# Patient Record
Sex: FEMALE | Race: BLACK OR AFRICAN AMERICAN
[De-identification: names, ages, dates, MRNs, and addresses within clinical notes are randomized per-mention and may not be internally consistent; named-entity substitution may affect disease eponyms.]

---

## 2022-01-09 ENCOUNTER — HOSPITAL ENCOUNTER (OUTPATIENT)
Dept: HOSPITAL 75 - WSO | Age: 27
Discharge: HOME | End: 2022-01-09
Attending: STUDENT IN AN ORGANIZED HEALTH CARE EDUCATION/TRAINING PROGRAM
Payer: MEDICAID

## 2022-01-09 VITALS — HEIGHT: 62.99 IN | BODY MASS INDEX: 43.05 KG/M2 | WEIGHT: 242.95 LBS

## 2022-01-09 VITALS — DIASTOLIC BLOOD PRESSURE: 66 MMHG | SYSTOLIC BLOOD PRESSURE: 116 MMHG

## 2022-01-09 DIAGNOSIS — O26.899: Primary | ICD-10-CM

## 2022-01-09 DIAGNOSIS — Z3A.00: ICD-10-CM

## 2022-01-09 DIAGNOSIS — R25.2: ICD-10-CM

## 2022-01-09 LAB
APTT PPP: YELLOW S
BACTERIA #/AREA URNS HPF: (no result) /HPF
BILIRUB UR QL STRIP: NEGATIVE
FIBRINOGEN PPP-MCNC: CLEAR MG/DL
GLUCOSE UR STRIP-MCNC: NEGATIVE MG/DL
KETONES UR QL STRIP: NEGATIVE
LEUKOCYTE ESTERASE UR QL STRIP: NEGATIVE
NITRITE UR QL STRIP: NEGATIVE
OTHER ELEMENTS URNS MICRO: (no result) /HPF
PH UR STRIP: 6 [PH] (ref 5–9)
PROT UR QL STRIP: NEGATIVE
RBC #/AREA URNS HPF: (no result) /HPF
SP GR UR STRIP: >=1.03 (ref 1.02–1.02)
SQUAMOUS #/AREA URNS HPF: (no result) /HPF
WBC #/AREA URNS HPF: (no result) /HPF
YEAST #/AREA URNS HPF: (no result) /HPF

## 2022-01-09 PROCEDURE — 87210 SMEAR WET MOUNT SALINE/INK: CPT

## 2022-01-09 PROCEDURE — 81000 URINALYSIS NONAUTO W/SCOPE: CPT

## 2022-01-09 PROCEDURE — 99214 OFFICE O/P EST MOD 30 MIN: CPT

## 2022-01-09 PROCEDURE — 87088 URINE BACTERIA CULTURE: CPT

## 2022-01-10 NOTE — PHYSICIAN QUERY-FINAL DX
Clinic Account Progress/Dx


Physician Query:


Please give diagnosis





Please include # weeks gestation


Date of Service





Jan 9, 2022 at 08:08











GEETHA,JOHNNY                      Johnny 10, 2022 08:28

## 2022-02-12 ENCOUNTER — HOSPITAL ENCOUNTER (OUTPATIENT)
Dept: HOSPITAL 75 - LDRP | Age: 27
End: 2022-02-12
Attending: OBSTETRICS & GYNECOLOGY
Payer: MEDICAID

## 2022-02-12 VITALS — SYSTOLIC BLOOD PRESSURE: 123 MMHG | DIASTOLIC BLOOD PRESSURE: 65 MMHG

## 2022-02-12 VITALS — BODY MASS INDEX: 45.66 KG/M2 | WEIGHT: 257.72 LBS | HEIGHT: 62.99 IN

## 2022-02-12 VITALS — DIASTOLIC BLOOD PRESSURE: 64 MMHG | SYSTOLIC BLOOD PRESSURE: 123 MMHG

## 2022-02-12 DIAGNOSIS — Z3A.28: ICD-10-CM

## 2022-02-12 DIAGNOSIS — O26.893: Primary | ICD-10-CM

## 2022-02-12 PROCEDURE — 99213 OFFICE O/P EST LOW 20 MIN: CPT

## 2022-02-14 NOTE — PHYSICIAN QUERY-FINAL DX
GEETHA,JAN 2/14/22 0809:


Clinic Account Progress/Dx


Physician Query:


Please give diagnosis





Please include # weeks gestation


Date of Service





Feb 12, 2022 at 21:35





JAMISON POWERS DO 2/14/22 1611:


Clinic Account Progress/Dx


DIAGNOSIS:


Diagnosis


28 week gestation


cramping











GEETHA,JAN Feb 14, 2022 08:09


JAMISON POWERS DO               Feb 14, 2022 16:11

## 2022-11-09 ENCOUNTER — HOSPITAL ENCOUNTER (EMERGENCY)
Dept: HOSPITAL 75 - ER | Age: 27
End: 2022-11-09
Payer: MEDICAID

## 2022-11-09 VITALS — SYSTOLIC BLOOD PRESSURE: 123 MMHG | DIASTOLIC BLOOD PRESSURE: 76 MMHG

## 2022-11-09 VITALS — BODY MASS INDEX: 41.41 KG/M2 | WEIGHT: 233.69 LBS | HEIGHT: 62.99 IN

## 2022-11-09 DIAGNOSIS — R10.13: ICD-10-CM

## 2022-11-09 DIAGNOSIS — Z28.310: ICD-10-CM

## 2022-11-09 DIAGNOSIS — R19.7: ICD-10-CM

## 2022-11-09 DIAGNOSIS — Z3A.10: ICD-10-CM

## 2022-11-09 DIAGNOSIS — O99.611: ICD-10-CM

## 2022-11-09 DIAGNOSIS — R11.0: ICD-10-CM

## 2022-11-09 DIAGNOSIS — O21.9: ICD-10-CM

## 2022-11-09 DIAGNOSIS — Z20.822: ICD-10-CM

## 2022-11-09 DIAGNOSIS — O26.891: Primary | ICD-10-CM

## 2022-11-09 DIAGNOSIS — R07.89: ICD-10-CM

## 2022-11-09 LAB
ALBUMIN SERPL-MCNC: 3.8 GM/DL (ref 3.2–4.5)
ALP SERPL-CCNC: 100 U/L (ref 40–136)
ALT SERPL-CCNC: 27 U/L (ref 0–55)
BASOPHILS # BLD AUTO: 0 10^3/UL (ref 0–0.1)
BASOPHILS NFR BLD AUTO: 0 % (ref 0–10)
BILIRUB SERPL-MCNC: 0.3 MG/DL (ref 0.1–1)
BUN/CREAT SERPL: 12
CALCIUM SERPL-MCNC: 9 MG/DL (ref 8.5–10.1)
CHLORIDE SERPL-SCNC: 109 MMOL/L (ref 98–107)
CO2 SERPL-SCNC: 19 MMOL/L (ref 21–32)
CREAT SERPL-MCNC: 0.77 MG/DL (ref 0.6–1.3)
EOSINOPHIL # BLD AUTO: 0.2 10^3/UL (ref 0–0.3)
EOSINOPHIL NFR BLD AUTO: 2 % (ref 0–10)
GFR SERPLBLD BASED ON 1.73 SQ M-ARVRAT: 110 ML/MIN
GLUCOSE SERPL-MCNC: 97 MG/DL (ref 70–105)
HCT VFR BLD CALC: 36 % (ref 35–52)
HGB BLD-MCNC: 11.9 G/DL (ref 11.5–16)
LIPASE SERPL-CCNC: 24 U/L (ref 8–78)
LYMPHOCYTES # BLD AUTO: 1.6 10^3/UL (ref 1–4)
LYMPHOCYTES NFR BLD AUTO: 21 % (ref 12–44)
MAGNESIUM SERPL-MCNC: 2 MG/DL (ref 1.6–2.4)
MANUAL DIFFERENTIAL PERFORMED BLD QL: NO
MCH RBC QN AUTO: 25 PG (ref 25–34)
MCHC RBC AUTO-ENTMCNC: 33 G/DL (ref 32–36)
MCV RBC AUTO: 75 FL (ref 80–99)
MONOCYTES # BLD AUTO: 0.6 10^3/UL (ref 0–1)
MONOCYTES NFR BLD AUTO: 8 % (ref 0–12)
NEUTROPHILS # BLD AUTO: 5.1 10^3/UL (ref 1.8–7.8)
NEUTROPHILS NFR BLD AUTO: 69 % (ref 42–75)
PLATELET # BLD: 297 10^3/UL (ref 130–400)
PMV BLD AUTO: 10.5 FL (ref 9–12.2)
POTASSIUM SERPL-SCNC: 3.6 MMOL/L (ref 3.6–5)
PROT SERPL-MCNC: 6.9 GM/DL (ref 6.4–8.2)
SODIUM SERPL-SCNC: 136 MMOL/L (ref 135–145)
WBC # BLD AUTO: 7.4 10^3/UL (ref 4.3–11)

## 2022-11-09 PROCEDURE — 80053 COMPREHEN METABOLIC PANEL: CPT

## 2022-11-09 PROCEDURE — 36415 COLL VENOUS BLD VENIPUNCTURE: CPT

## 2022-11-09 PROCEDURE — 93005 ELECTROCARDIOGRAM TRACING: CPT

## 2022-11-09 PROCEDURE — 83690 ASSAY OF LIPASE: CPT

## 2022-11-09 PROCEDURE — 83735 ASSAY OF MAGNESIUM: CPT

## 2022-11-09 PROCEDURE — 85025 COMPLETE CBC W/AUTO DIFF WBC: CPT

## 2022-11-09 PROCEDURE — 87636 SARSCOV2 & INF A&B AMP PRB: CPT

## 2022-11-09 NOTE — ED GENERAL
General


Chief Complaint:  Chest Pain


Stated Complaint:  CHEST PAIN, BACK PAIN, PREGNANT


Nursing Triage Note:  


pt ambulatory to room. states she started to have chest pain that radiates 


through to her back approx two hours pta while walking around. states the pain 


feels like she fell on her back but she denies injury. pt states she also has 


middle abd pain that started approx 1 hr pta. states she is 10 weeks pregnant, 


denies any vag bleeding. states she has not taken any tylenol or other meds for 


pain.


Source of Information:  Patient


Exam Limitations:  No Limitations





History of Present Illness


Date Seen by Provider:  Nov 9, 2022


Time Seen by Provider:  18:21





Allergies and Home Medications


Allergies


Coded Allergies:  


     No Known Drug Allergies (Unverified , 11/13/15)





Patient Home Medication List


Home Medication List Reviewed:  Yes


Famotidine (Pepcid) 20 Mg Tablet, 20 MG PO BID


   Prescribed by: ROMERO MCDONNELL on 11/9/22 2025


Prenatal Vit #76/Iron,Carb/FA (Pnv 29-1 Tablet) 1 Each Tablet, 1 EACH PO, 

(Reported)


   Entered as Reported by: KANDIS PEREIRA on 7/23/16 1751


Promethazine HCl (Promethazine Tablet) 25 Mg Tablet, 25 MG PO Q8H PRN for 

NAUSEA/VOMITING


   Prescribed by: ROMERO MCDONNELL on 11/9/22 2025





Past Medical-Social-Family Hx


Patient Social History


Tobacco Use?:  No


Use of E-Cig and/or Vaping dev:  No


Substance use?:  No


Alcohol Use?:  No





Immunizations Up To Date


Influenza Vaccine Up-to-Date:  No; Not Current





Seasonal Allergies


Seasonal Allergies:  No





Past Medical History


Irregular Heartbeat


Headaches /Migraines


Last Menstrual Period:  Aug 17, 2022


Reproductive Disorders:  No


Adverse Reaction/Blood Tranf:  No





Family Medical History





Patient reports no known family medical history.


No Pertinent Family Hx





Physical Exam


Vital Signs





Vital Signs - First Documented








 11/9/22





 18:33


 


Temp 37.0


 


Pulse 108


 


Resp 24


 


B/P (MAP) 142/74 (96)


 


Pulse Ox 100





Capillary Refill :


Height, Weight, BMI


Height: 5'4.00"


Weight: 211lbs. 6.0oz. 95.057732ld; 41.00 BMI


Method:Stated


General Appearance:  No Apparent Distress, WD/WN, Obese


Cardiovascular:  Other (Anterior chest wall tenderness)


Gastrointestinal:  Normal Bowel Sounds, Soft; No Distended; Tenderness (Across 

the upper abdomen and most prominent in the epigastrium)





Progress/Results/Core Measures


Suspected Sepsis


SIRS


Temperature: 


Pulse: 108 


Respiratory Rate: 24


 


Laboratory Tests


11/9/22 19:20: White Blood Count 7.4


Blood Pressure 142 /74 


Mean: 96


 


Laboratory Tests


11/9/22 19:20: 


Creatinine 0.77, Platelet Count 297, Total Bilirubin 0.3








Results/Orders


Lab Results





Laboratory Tests








Test


 11/9/22


18:39 11/9/22


19:20 Range/Units


 


 


Influenza Type A (RT-PCR) Not Detected   Not Detecte  


 


Influenza Type B (RT-PCR) Not Detected   Not Detecte  


 


SARS-CoV-2 RNA (RT-PCR) Not Detected   Not Detecte  


 


White Blood Count


 


 7.4 


 4.3-11.0


10^3/uL


 


Red Blood Count


 


 4.80 


 3.80-5.11


10^6/uL


 


Hemoglobin  11.9  11.5-16.0  g/dL


 


Hematocrit  36  35-52  %


 


Mean Corpuscular Volume  75 L 80-99  fL


 


Mean Corpuscular Hemoglobin  25  25-34  pg


 


Mean Corpuscular Hemoglobin


Concent 


 33 


 32-36  g/dL





 


Red Cell Distribution Width  16.2 H 10.0-14.5  %


 


Platelet Count


 


 297 


 130-400


10^3/uL


 


Mean Platelet Volume  10.5  9.0-12.2  fL


 


Immature Granulocyte % (Auto)  0   %


 


Neutrophils (%) (Auto)  69  42-75  %


 


Lymphocytes (%) (Auto)  21  12-44  %


 


Monocytes (%) (Auto)  8  0-12  %


 


Eosinophils (%) (Auto)  2  0-10  %


 


Basophils (%) (Auto)  0  0-10  %


 


Neutrophils # (Auto)


 


 5.1 


 1.8-7.8


10^3/uL


 


Lymphocytes # (Auto)


 


 1.6 


 1.0-4.0


10^3/uL


 


Monocytes # (Auto)


 


 0.6 


 0.0-1.0


10^3/uL


 


Eosinophils # (Auto)


 


 0.2 


 0.0-0.3


10^3/uL


 


Basophils # (Auto)


 


 0.0 


 0.0-0.1


10^3/uL


 


Immature Granulocyte # (Auto)


 


 0.0 


 0.0-0.1


10^3/uL


 


Sodium Level  136  135-145  MMOL/L


 


Potassium Level  3.6  3.6-5.0  MMOL/L


 


Chloride Level  109 H   MMOL/L


 


Carbon Dioxide Level  19 L 21-32  MMOL/L


 


Anion Gap  8  5-14  MMOL/L


 


Blood Urea Nitrogen  9  7-18  MG/DL


 


Creatinine


 


 0.77 


 0.60-1.30


MG/DL


 


Estimat Glomerular Filtration


Rate 


 110 


  





 


BUN/Creatinine Ratio  12   


 


Glucose Level  97    MG/DL


 


Calcium Level  9.0  8.5-10.1  MG/DL


 


Corrected Calcium  9.2  8.5-10.1  MG/DL


 


Magnesium Level  2.0  1.6-2.4  MG/DL


 


Total Bilirubin  0.3  0.1-1.0  MG/DL


 


Aspartate Amino Transf


(AST/SGOT) 


 18 


 5-34  U/L





 


Alanine Aminotransferase


(ALT/SGPT) 


 27 


 0-55  U/L





 


Alkaline Phosphatase  100    U/L


 


Total Protein  6.9  6.4-8.2  GM/DL


 


Albumin  3.8  3.2-4.5  GM/DL


 


Lipase  24  8-78  U/L








My Orders





Orders - ROMERO BANSAL MD


Covid 19 Inhouse Test (11/9/22 18:21)


Influenza A And B By Pcr (11/9/22 18:21)


Ekg Tracing (11/9/22 18:36)


Lidocaine 2% Viscous 15 Ml (Xylocaine Vi (11/9/22 19:00)


Antacid  Suspension (Mylanta  Suspension (11/9/22 19:00)


Famotidine Tablet (Pepcid Tablet) (11/9/22 19:00)


Cbc With Automated Diff (11/9/22 19:00)


Comprehensive Metabolic Panel (11/9/22 19:00)


Lipase (11/9/22 19:00)


Magnesium (11/9/22 19:00)


Ed Iv/Invasive Line Start (11/9/22 19:00)


Lactated Ringers (Lr 1000 Ml Iv Solution (11/9/22 19:00)


Promethazine Injection (Phenergan Injec (11/9/22 19:00)





Medications Given in ED





Current Medications








 Medications  Dose


 Ordered  Sig/Leyda


 Route  Start Time


 Stop Time Status Last Admin


Dose Admin


 


 Al Hydrox/Mg


 Hydrox/Simethicone  30 ml  ONCE  ONCE


 PO  11/9/22 19:00


 11/9/22 19:01 DC 11/9/22 20:10


30 ML


 


 Famotidine  20 mg  ONCE  ONCE


 PO  11/9/22 19:00


 11/9/22 19:01 DC 11/9/22 20:09


20 MG


 


 Lactated Ringer's  1,000 ml @ 


 0 mls/hr  Q0M ONCE


 IV  11/9/22 19:00


 11/9/22 19:01 DC 11/9/22 19:15


999 MLS/HR


 


 Lidocaine HCl  15 ml  ONCE  ONCE


 PO  11/9/22 19:00


 11/9/22 19:01 DC 11/9/22 20:10


15 ML


 


 Promethazine HCl  25 mg  ONCE  ONCE


 IVP  11/9/22 19:00


 11/9/22 19:01 DC 11/9/22 19:15


25 MG








Vital Signs/I&O











 11/9/22 11/9/22





 18:33 21:05


 


Temp 37.0 37.0


 


Pulse 108 91


 


Resp 24 16


 


B/P (MAP) 142/74 (96) 123/76


 


Pulse Ox 100 100





Capillary Refill :








Blood Pressure Mean:                    96











ECG


Initial ECG Impression Date:  Nov 9, 2022


Initial ECG Impression Time:  18:44


Initial ECG Rate:  91


Initial ECG Rhythm:  Normal Sinus


Initial ECG Impression:  Normal


Comment


Sinus rhythm with no ST elevation or depression.  No abnormal intervals or axis 

deviation.





Departure


Impression





   Primary Impression:  


   Nausea vomiting and diarrhea


   Additional Impressions:  


   Upper abdominal pain


   Atypical chest pain


   Pregnancy


   Qualified Codes:  Z3A.10 - 10 weeks gestation of pregnancy


Disposition:  01 HOME, SELF-CARE


Condition:  Improved





Departure-Patient Inst.


Decision time for Depature:  20:22


Referrals:  


CALI ROBLES DO (PCP/Family)


Primary Care Physician


Patient Instructions:  Abdominal Pain, Adult ED, Diarrhea in Adolescents and 

Adults, Nausea and Vomiting, Adult





Add. Discharge Instructions:  


Adhere to a noncarbonated clear liquid diet this evening.


If you are feeling better in the morning, gradually advance your diet with small

quantities of bland food as tolerated.  Avoid dairy products, fatty foods, and 

greasy foods until your diarrhea has been resolved for at least 48 hours.


Use promethazine (Phenergan) as prescribed for nausea or vomiting.  This 

medication may make you drowsy.  You should not drive, operate machinery, or 

make important decisions while on this medication.


Take an antacid medication such as the Pepcid (famotidine) prescribed for at 

least the next 1 to 2 weeks.  Then discussed with Dr. WRIGHT at your 

follow-up appointment.


For discomfort you may take Tylenol (acetaminophen) up to 1000 mg every 6 hours 

as needed.  For heartburn or pain in your upper abdomen, you may also take Tums 

per package instructions.


Return to the emergency room if you have worsening symptoms despite following 

these instructions.





All discharge instructions reviewed with patient and/or family. Voiced 

understanding.


Scripts


Promethazine HCl (Promethazine Tablet) 25 Mg Tablet


25 MG PO Q8H PRN for NAUSEA/VOMITING, #10 TAB


   Prov: ROMERO BANSAL MD         11/9/22 


Famotidine (Pepcid) 20 Mg Tablet


20 MG PO BID, #30 TAB


   Prov: ROMERO BANSAL MD         11/9/22





Copy


Copies To 1:   JEAN WRIGHT MD, JOSHUA T MD         Nov 9, 2022 20:25

## 2023-01-16 ENCOUNTER — HOSPITAL ENCOUNTER (OUTPATIENT)
Dept: HOSPITAL 75 - RAD | Age: 28
End: 2023-01-16
Attending: OBSTETRICS & GYNECOLOGY
Payer: MEDICAID

## 2023-01-16 DIAGNOSIS — Z3A.20: ICD-10-CM

## 2023-01-16 DIAGNOSIS — Z34.82: Primary | ICD-10-CM

## 2023-01-16 PROCEDURE — 76805 OB US >/= 14 WKS SNGL FETUS: CPT

## 2023-01-16 NOTE — DIAGNOSTIC IMAGING REPORT
INDICATION: Pregnant, routine  care, anatomic survey.



TECHNIQUE: Multiple real-time grayscale images were obtained over

the gravid uterus.



COMPARISON: None.



FINDINGS: 



There is a single live intrauterine gestation in cephalic

presentation. The amniotic fluid index measures 17.8 cm. The

fetal heart rate measures 146 BPM. The placenta is anterior

without evidence of previa. The cervix is measured at 5.3 cm.

There is no funneling seen. The upper and lower spine are seen.

The right and left ventricular outflow tracts are seen. The

stomach is seen. The heart is suboptimally visualized. The

lateral ventricles are seen. The cerebellum and cisterna magna

are seen. The kidneys are seen. The bladder is seen. Two

umbilical arteries are seen, consistent with a three-vessel cord.

The fetal cord insertion is seen.



Biometrical measurements are as follows:

Biparietal 4.48 cm, age 19 weeks 4 days.

Head circumference 17.61 cm, age 20 weeks 1 days.

Abdominal circumference 15.53 cm, age 20 weeks 5 days.

Femur length 3.19 cm, age 20 weeks 0 days.



Sonographic estimate age: 2023 weeks 20 days.

Sonographic estimated date of delivery: 1.



Estimated Fetal Weight: 344 gm (+/- 51  gm).

LMP percentile: 71%.



Fetal heart rate: 146 beats per minute.



Fetal number: 1 of 1.



IMPRESSION:

1. Single live intrauterine gestation measuring at 20 weeks and 1

day which is within range of the clinical dates.

2. No abnormalities seen on anatomic survey. The four-chamber

heart is suboptimally visualized.



Dictated by: 



  Dictated on workstation # [id number]

## 2023-01-31 ENCOUNTER — HOSPITAL ENCOUNTER (OUTPATIENT)
Dept: HOSPITAL 75 - LDRP | Age: 28
Discharge: HOME | End: 2023-01-31
Attending: OBSTETRICS & GYNECOLOGY
Payer: MEDICAID

## 2023-01-31 VITALS — SYSTOLIC BLOOD PRESSURE: 126 MMHG | DIASTOLIC BLOOD PRESSURE: 65 MMHG

## 2023-01-31 VITALS — HEIGHT: 62.99 IN | BODY MASS INDEX: 43.36 KG/M2 | WEIGHT: 244.71 LBS

## 2023-01-31 DIAGNOSIS — O99.283: ICD-10-CM

## 2023-01-31 DIAGNOSIS — Z3A.22: ICD-10-CM

## 2023-01-31 LAB
APTT PPP: YELLOW S
BACTERIA #/AREA URNS HPF: (no result) /HPF
BILIRUB UR QL STRIP: NEGATIVE
FIBRINOGEN PPP-MCNC: CLEAR MG/DL
GLUCOSE UR STRIP-MCNC: NEGATIVE MG/DL
KETONES UR QL STRIP: NEGATIVE
LEUKOCYTE ESTERASE UR QL STRIP: NEGATIVE
NITRITE UR QL STRIP: NEGATIVE
PH UR STRIP: 6.5 [PH] (ref 5–9)
PROT UR QL STRIP: NEGATIVE
RBC #/AREA URNS HPF: (no result) /HPF
SP GR UR STRIP: 1.02 (ref 1.02–1.02)
SQUAMOUS #/AREA URNS HPF: >50 /HPF
WBC #/AREA URNS HPF: (no result) /HPF

## 2023-01-31 PROCEDURE — 81000 URINALYSIS NONAUTO W/SCOPE: CPT

## 2023-02-01 NOTE — PHYSICIAN QUERY-FINAL DX
DEBBIEJAN 2/1/23 0831:


Clinic Account Progress/Dx


Physician Query:


Please give diagnosis





Please include # weeks gestation


Date of Service





Jan 31, 2023 at 16:44





JR GARCIA DO 2/1/23 1008:


Clinic Account Progress/Dx


DIAGNOSIS:


Diagnosis


22 week IUP


Dehydration


Uterine cramps











DEBBIEJAN Feb 1, 2023 08:31


JR GARCIA DO             Feb 1, 2023 10:08